# Patient Record
Sex: FEMALE | URBAN - METROPOLITAN AREA
[De-identification: names, ages, dates, MRNs, and addresses within clinical notes are randomized per-mention and may not be internally consistent; named-entity substitution may affect disease eponyms.]

---

## 2023-01-04 ENCOUNTER — OFFICE VISIT (OUTPATIENT)
Facility: LOCATION | Age: 73
End: 2023-01-04
Payer: MEDICARE

## 2023-01-04 DIAGNOSIS — H02.831 DERMATOCHALASIS OF RIGHT UPPER EYELID: ICD-10-CM

## 2023-01-04 DIAGNOSIS — H02.413 MECHANICAL PTOSIS OF BILATERAL EYELIDS: ICD-10-CM

## 2023-01-04 DIAGNOSIS — H53.40 VISUAL FIELD DEFECT: Primary | ICD-10-CM

## 2023-01-04 DIAGNOSIS — H02.834 DERMATOCHALASIS OF LEFT UPPER EYELID: ICD-10-CM

## 2023-01-04 DIAGNOSIS — H02.423 MYOGENIC PTOSIS OF BILATERAL EYELIDS: ICD-10-CM

## 2023-01-04 DIAGNOSIS — Z96.1 PRESENCE OF PSEUDOPHAKIA: ICD-10-CM

## 2023-01-04 PROCEDURE — 99204 OFFICE O/P NEW MOD 45 MIN: CPT | Performed by: OPHTHALMOLOGY

## 2023-01-04 PROCEDURE — 92285 EXTERNAL OCULAR PHOTOGRAPHY: CPT | Performed by: OPHTHALMOLOGY

## 2023-01-04 ASSESSMENT — INTRAOCULAR PRESSURE
OS: 15
OD: 17

## 2023-01-04 NOTE — IMPRESSION/PLAN
Impression: Mechanical ptosis of bilateral eyelids: H02.413. Plan: Patient examined. Chart review. Patient has signs, symptoms and findings consistent with BOTH myogenic and mechanical ptosis. This was diagrammatically explaind to the patient. Patient voiced understanding. Discussed known options for management (do nothing, conservative management, and surgical intervention.) Risks of surgery discussed: bleeding, infection, dry eye, asymmetry, numbness, and/or need for further surgery. Patient wishes to proceed  with surgery.   

[id number]- BILATERAL UPPER LID EXTERNAL APPROACH TARSOLEVATOR ADVANCEMENT
[id number]- BILATERAL UPPER LID BLEPHAROPLASTY

## 2023-01-04 NOTE — IMPRESSION/PLAN
Impression: Visual field defect: H53.40. Plan: Patient subjectively describes peripheral field defect. VF reviewed from Dr. Rashmi Bean 12/9/2022 . Formal visual field testing reflects the clinical concern at this time. Ptosis effect OD at 0 degrees untapped & 50 degrees taped improvement. Ptosis effect OS at 0 degrees untapped & 40 degrees taped improvement.

## 2023-05-23 ENCOUNTER — OFFICE VISIT (OUTPATIENT)
Dept: URBAN - METROPOLITAN AREA CLINIC 52 | Facility: CLINIC | Age: 73
End: 2023-05-23
Payer: MEDICARE

## 2023-05-23 PROCEDURE — 92004 COMPRE OPH EXAM NEW PT 1/>: CPT | Performed by: OPHTHALMOLOGY

## 2023-05-23 PROCEDURE — 92285 EXTERNAL OCULAR PHOTOGRAPHY: CPT | Performed by: OPHTHALMOLOGY

## 2023-05-23 NOTE — IMPRESSION/PLAN
Impression: Myogenic ptosis of bilateral eyelids: H02.423. Plan: Discussed diagnosis in detail with patient. Discussed treatment options with patient. Discussed risks and benefits and patient understands. Advised patient of condition. Patient elects to have surgery. Described risk of asymmetry following surgical treatment, sometimes requiring secondary surgical intervention. Bleeding and infection, though rare complications, were mentioned to patient. All patient questions addressed and answered. Schedule surgery in the OR. Patient will return postoperatively for suture removal and postoperative care. -Advise patient to STOP all OTC blood thinners 10-14 days prior to Surgery RTC HVF superior 64 taped/untapped

## 2023-05-25 ENCOUNTER — TESTING ONLY (OUTPATIENT)
Facility: LOCATION | Age: 73
End: 2023-05-25
Payer: MEDICARE

## 2023-05-25 DIAGNOSIS — H02.423 MYOGENIC PTOSIS OF BILATERAL EYELIDS: Primary | ICD-10-CM

## 2023-05-25 PROCEDURE — 92081 LIMITED VISUAL FIELD XM: CPT | Performed by: OPTOMETRIST
